# Patient Record
Sex: FEMALE | Race: BLACK OR AFRICAN AMERICAN | NOT HISPANIC OR LATINO | Employment: UNEMPLOYED | URBAN - METROPOLITAN AREA
[De-identification: names, ages, dates, MRNs, and addresses within clinical notes are randomized per-mention and may not be internally consistent; named-entity substitution may affect disease eponyms.]

---

## 2023-11-03 ENCOUNTER — HOSPITAL ENCOUNTER (EMERGENCY)
Facility: HOSPITAL | Age: 68
Discharge: HOME | End: 2023-11-03

## 2023-11-03 VITALS
RESPIRATION RATE: 20 BRPM | SYSTOLIC BLOOD PRESSURE: 100 MMHG | BODY MASS INDEX: 41.96 KG/M2 | TEMPERATURE: 97.1 F | HEART RATE: 81 BPM | WEIGHT: 228 LBS | DIASTOLIC BLOOD PRESSURE: 68 MMHG | OXYGEN SATURATION: 100 % | HEIGHT: 62 IN

## 2023-11-03 DIAGNOSIS — L30.9 DERMATITIS OF LOWER EXTREMITY: Primary | ICD-10-CM

## 2023-11-03 PROCEDURE — 99283 EMERGENCY DEPT VISIT LOW MDM: CPT

## 2023-11-03 PROCEDURE — 2500000001 HC RX 250 WO HCPCS SELF ADMINISTERED DRUGS (ALT 637 FOR MEDICARE OP): Performed by: NURSE PRACTITIONER

## 2023-11-03 PROCEDURE — 99283 EMERGENCY DEPT VISIT LOW MDM: CPT | Performed by: NURSE PRACTITIONER

## 2023-11-03 RX ORDER — DIPHENHYDRAMINE HCL 25 MG
25 CAPSULE ORAL ONCE
Status: COMPLETED | OUTPATIENT
Start: 2023-11-03 | End: 2023-11-03

## 2023-11-03 RX ORDER — TRIAMCINOLONE ACETONIDE 1 MG/G
CREAM TOPICAL ONCE
Status: COMPLETED | OUTPATIENT
Start: 2023-11-03 | End: 2023-11-03

## 2023-11-03 RX ORDER — TRIAMCINOLONE ACETONIDE 1 MG/G
1 CREAM TOPICAL 2 TIMES DAILY
Qty: 2 G | Refills: 0 | Status: SHIPPED | OUTPATIENT
Start: 2023-11-03 | End: 2023-11-13

## 2023-11-03 RX ORDER — DIPHENHYDRAMINE HCL 25 MG
25 CAPSULE ORAL 2 TIMES DAILY
Qty: 14 CAPSULE | Refills: 0 | Status: SHIPPED | OUTPATIENT
Start: 2023-11-03 | End: 2023-11-10

## 2023-11-03 RX ADMIN — TRIAMCINOLONE ACETONIDE: 1 CREAM TOPICAL at 20:25

## 2023-11-03 RX ADMIN — DIPHENHYDRAMINE HYDROCHLORIDE 25 MG: 25 CAPSULE ORAL at 20:30

## 2023-11-03 ASSESSMENT — PAIN SCALES - GENERAL: PAINLEVEL_OUTOF10: 7

## 2023-11-03 ASSESSMENT — PAIN DESCRIPTION - PROGRESSION: CLINICAL_PROGRESSION: GRADUALLY WORSENING

## 2023-11-03 ASSESSMENT — PAIN DESCRIPTION - DESCRIPTORS: DESCRIPTORS: BURNING

## 2023-11-03 ASSESSMENT — COLUMBIA-SUICIDE SEVERITY RATING SCALE - C-SSRS
6. HAVE YOU EVER DONE ANYTHING, STARTED TO DO ANYTHING, OR PREPARED TO DO ANYTHING TO END YOUR LIFE?: NO
2. HAVE YOU ACTUALLY HAD ANY THOUGHTS OF KILLING YOURSELF?: NO
1. IN THE PAST MONTH, HAVE YOU WISHED YOU WERE DEAD OR WISHED YOU COULD GO TO SLEEP AND NOT WAKE UP?: NO

## 2023-11-03 ASSESSMENT — PAIN DESCRIPTION - LOCATION: LOCATION: LEG

## 2023-11-03 ASSESSMENT — PAIN DESCRIPTION - ONSET: ONSET: ONGOING

## 2023-11-03 ASSESSMENT — PAIN - FUNCTIONAL ASSESSMENT: PAIN_FUNCTIONAL_ASSESSMENT: 0-10

## 2023-11-03 ASSESSMENT — PAIN DESCRIPTION - FREQUENCY: FREQUENCY: CONSTANT/CONTINUOUS

## 2023-11-03 ASSESSMENT — PAIN DESCRIPTION - PAIN TYPE: TYPE: ACUTE PAIN

## 2023-11-03 ASSESSMENT — PAIN DESCRIPTION - ORIENTATION: ORIENTATION: RIGHT;LEFT

## 2023-11-03 NOTE — ED TRIAGE NOTES
Patient to ED with complaints of bilateral lower extremity itching, burning, and pain. Reports this has been an issue for at least 2 months. Was seen at [facility] and given bumex. Discontinued bumex about 1 week ago. Denies SOB and CP.

## 2023-11-04 NOTE — ED PROVIDER NOTES
HPI   No chief complaint on file.      68-year-old female who lives in a shelter presents today with bilateral itching of her lower extremities.  There is signs where she is scratching both her legs.  She states that it is burning and itching.  The shelter where she lives she denies any concern for bedbugs and states that they spray everything down to make sure that there are no bedbugs.  I advised patient that that spray could be causing a dermatitis to her legs.  She denies change in skin temperature or color.  She denies history of DVT.  She denies fever, headache, chest pain, dyspnea, abdominal pain, nausea or vomiting.  She has no other cause for concern or complaint at this time.  This is been ongoing for approximately 2 months while she has been living in the shelter.      History provided by:  Patient   used: No                        No data recorded                Patient History   Past Medical History:   Diagnosis Date    COPD (chronic obstructive pulmonary disease) (CMS/[facility])      History reviewed. No pertinent surgical history.  No family history on file.  Social History     Tobacco Use    Smoking status: Not on file    Smokeless tobacco: Not on file   Substance Use Topics    Alcohol use: Not on file    Drug use: Not on file       Physical Exam   ED Triage Vitals [11/03/23 1855]   Temp Heart Rate Resp BP   36.2 °C (97.1 °F) 81 20 100/68      SpO2 Temp Source Heart Rate Source Patient Position   100 % Temporal Monitor Sitting      BP Location FiO2 (%)     Left arm --       Physical Exam  Constitutional:       Appearance: She is obese.   HENT:      Head: Normocephalic and atraumatic.      Right Ear: Tympanic membrane normal.      Left Ear: Tympanic membrane normal.      Nose: Nose normal.      Mouth/Throat:      Mouth: Mucous membranes are dry.   Eyes:      Extraocular Movements: Extraocular movements intact.      Pupils: Pupils are equal, round, and reactive to light.   Cardiovascular:       Rate and Rhythm: Normal rate and regular rhythm.   Pulmonary:      Effort: Pulmonary effort is normal.      Breath sounds: Normal breath sounds.   Abdominal:      General: Abdomen is flat.      Palpations: Abdomen is soft.   Musculoskeletal:         General: Normal range of motion.      Cervical back: Normal range of motion.   Skin:     General: Skin is warm.      Capillary Refill: Capillary refill takes less than 2 seconds.   Neurological:      General: No focal deficit present.      Mental Status: She is alert and oriented to person, place, and time.   Psychiatric:         Mood and Affect: Mood normal.         Behavior: Behavior normal.         ED Course & MDM   Diagnoses as of 11/03/23 2123   Dermatitis of lower extremity       Medical Decision Making  Patient received 25 mg oral Benadryl and I ordered triamcinolone cream.  She was diagnosed with dermatitis which could be due to the spray that they are using to remove the bedbugs from her shelter.  I requested that she be given some form of transportation back to her shelter.  Safely discharged home with careful return precautions        Procedure  Procedures     Kevin Todd, LANA-CNP  11/03/23 2124